# Patient Record
Sex: MALE | Race: WHITE | Employment: FULL TIME | URBAN - METROPOLITAN AREA
[De-identification: names, ages, dates, MRNs, and addresses within clinical notes are randomized per-mention and may not be internally consistent; named-entity substitution may affect disease eponyms.]

---

## 2020-12-16 ENCOUNTER — OFFICE VISIT (OUTPATIENT)
Dept: NEUROLOGY | Facility: CLINIC | Age: 70
End: 2020-12-16
Payer: COMMERCIAL

## 2020-12-16 VITALS — WEIGHT: 193 LBS | BODY MASS INDEX: 26.14 KG/M2 | HEIGHT: 72 IN

## 2020-12-16 DIAGNOSIS — I25.10 ATHEROSCLEROSIS OF NATIVE CORONARY ARTERY OF NATIVE HEART WITHOUT ANGINA PECTORIS: ICD-10-CM

## 2020-12-16 DIAGNOSIS — K21.9 GASTROESOPHAGEAL REFLUX DISEASE WITHOUT ESOPHAGITIS: ICD-10-CM

## 2020-12-16 DIAGNOSIS — Z98.890 HISTORY OF LUMBAR LAMINECTOMY: Primary | ICD-10-CM

## 2020-12-16 PROBLEM — R20.0 NUMBNESS OF RIGHT FOOT: Status: ACTIVE | Noted: 2018-05-15

## 2020-12-16 PROCEDURE — 3008F BODY MASS INDEX DOCD: CPT | Performed by: PHYSICAL MEDICINE & REHABILITATION

## 2020-12-16 PROCEDURE — 99204 OFFICE O/P NEW MOD 45 MIN: CPT | Performed by: PHYSICAL MEDICINE & REHABILITATION

## 2020-12-16 RX ORDER — ATORVASTATIN CALCIUM 80 MG/1
80 TABLET, FILM COATED ORAL NIGHTLY
COMMUNITY
Start: 2017-01-01

## 2020-12-16 RX ORDER — TIZANIDINE 2 MG/1
2 TABLET ORAL
COMMUNITY
Start: 2020-12-08

## 2020-12-16 RX ORDER — SILDENAFIL 100 MG/1
TABLET, FILM COATED ORAL
COMMUNITY
Start: 2019-12-31

## 2020-12-16 RX ORDER — ALFUZOSIN HYDROCHLORIDE 10 MG/1
10 TABLET, EXTENDED RELEASE ORAL DAILY
COMMUNITY

## 2020-12-16 RX ORDER — OMEPRAZOLE 20 MG/1
20 CAPSULE, DELAYED RELEASE ORAL
COMMUNITY

## 2020-12-16 RX ORDER — DICLOFENAC SODIUM 75 MG/1
75 TABLET, DELAYED RELEASE ORAL 2 TIMES DAILY
COMMUNITY
Start: 2020-12-08

## 2020-12-16 RX ORDER — ASPIRIN 81 MG/1
1 TABLET, CHEWABLE ORAL DAILY
COMMUNITY

## 2020-12-16 RX ORDER — DICLOFENAC SODIUM 75 MG/1
TABLET, DELAYED RELEASE ORAL
COMMUNITY
Start: 2020-12-08 | End: 2020-12-16

## 2020-12-16 RX ORDER — CLOBETASOL PROPIONATE 0.5 MG/G
OINTMENT TOPICAL
COMMUNITY
Start: 2020-08-20

## 2020-12-16 NOTE — PROGRESS NOTES
[address]  Progress Note    CHIEF COMPLAINT:  Patient presents with:  Low Back Pain: New patient referred by Dr. Amparo Monae for low back pain.  Patient states that he hurt his back at work in 2015 while trowing walking are all fine. He walked an hour and a half on the treadmill the other day. PAST MEDICAL HISTORY:  No past medical history on file. SURGICAL HISTORY:  No past surgical history on file.     SOCIAL HISTORY:   Social History    Occupational Histo denies  Bladder Incontinence: denies  Pelvic Pain: denies  Painful Urination: denies   Musculoskeletal  Joint Stiffness: admits  Painful Joints: admits  Tailbone Pain: denies  Swollen Joints: denies   Peripheral Vascular  Swelling of Legs/Feet: denies  Col ASSESSMENT AND PLAN:  1. History of lumbar laminectomy  He is doing extremely well status post fusion. He is concerned that he is not progressing as seamlessly as he desires back to full duty work. I reassured him that he is doing well.   He is 70 and i

## 2020-12-17 ENCOUNTER — MED REC SCAN ONLY (OUTPATIENT)
Dept: NEUROLOGY | Facility: CLINIC | Age: 70
End: 2020-12-17

## 2020-12-21 ENCOUNTER — PATIENT MESSAGE (OUTPATIENT)
Dept: NEUROLOGY | Facility: CLINIC | Age: 70
End: 2020-12-21

## 2020-12-21 RX ORDER — PREDNISONE 10 MG/1
TABLET ORAL
Qty: 28 TABLET | Refills: 0 | Status: SHIPPED | OUTPATIENT
Start: 2020-12-21 | End: 2021-01-05 | Stop reason: ALTCHOICE

## 2020-12-21 RX ORDER — DIAZEPAM 5 MG/1
5 TABLET ORAL NIGHTLY
Qty: 14 TABLET | Refills: 0 | Status: SHIPPED | OUTPATIENT
Start: 2020-12-21 | End: 2020-12-23

## 2020-12-21 NOTE — TELEPHONE ENCOUNTER
From: Shannon Almaguer  To: Priscilla Hankins MD  Sent: 12/21/2020 9:46 AM CST  Subject: Other    My pain in my lumbar spine is getting progressively worse. I get episodes of moderate to severe pain later in the day from 6 to 7+.  Very mild stretching madeline

## 2020-12-22 ENCOUNTER — TELEPHONE (OUTPATIENT)
Dept: NEUROLOGY | Facility: CLINIC | Age: 70
End: 2020-12-22

## 2020-12-22 NOTE — TELEPHONE ENCOUNTER
Patient states Dr. Quoc Deluca gave him Tramadol and Tizanidine. Patient started the medication and is unsure what he should do with the medication from Dr. Charles Villatoro. Dr. Chapincito Weeks recommended patient f/u with Dr. Charles Villatoro. Patient scheduled appt for 12/23/20.

## 2020-12-23 ENCOUNTER — MED REC SCAN ONLY (OUTPATIENT)
Dept: NEUROLOGY | Facility: CLINIC | Age: 70
End: 2020-12-23

## 2020-12-23 ENCOUNTER — PATIENT MESSAGE (OUTPATIENT)
Dept: NEUROLOGY | Facility: CLINIC | Age: 70
End: 2020-12-23

## 2020-12-23 ENCOUNTER — TELEPHONE (OUTPATIENT)
Dept: NEUROLOGY | Facility: CLINIC | Age: 70
End: 2020-12-23

## 2020-12-23 ENCOUNTER — OFFICE VISIT (OUTPATIENT)
Dept: NEUROLOGY | Facility: CLINIC | Age: 70
End: 2020-12-23
Payer: COMMERCIAL

## 2020-12-23 VITALS — HEIGHT: 72 IN | BODY MASS INDEX: 26.14 KG/M2 | WEIGHT: 193 LBS

## 2020-12-23 DIAGNOSIS — M96.1 POSTLAMINECTOMY SYNDROME: Primary | ICD-10-CM

## 2020-12-23 PROCEDURE — 99214 OFFICE O/P EST MOD 30 MIN: CPT | Performed by: PHYSICAL MEDICINE & REHABILITATION

## 2020-12-23 PROCEDURE — 3008F BODY MASS INDEX DOCD: CPT | Performed by: PHYSICAL MEDICINE & REHABILITATION

## 2020-12-23 RX ORDER — TRAMADOL HYDROCHLORIDE 50 MG/1
50 TABLET ORAL
COMMUNITY
Start: 2020-12-21

## 2020-12-23 NOTE — TELEPHONE ENCOUNTER
CT SPINE LUMBAR FOR MYELOGRAM (IV)(CPT=72132-pending approval  Called [facility] spoke to [place] A who states authorization and clincal notes are require. Will fax clinical notes once available.    Case # [id number]   Fax to : [phone]    12/24/20 UPDATE:  Marvin Medeiros

## 2020-12-23 NOTE — PROGRESS NOTES
[address]  Progress Note    CHIEF COMPLAINT:  Patient presents with:  Low Back Pain: Patient presents to follow up on low back pain.  LOV 12/16/2020 Patient states that his pain has tremendously icreased sin severe, idiosyncratic exacerbating factors with some persistent numbness of the lateral aspect of the right foot. There is no sciatica. No bowel or bladder incontinence. Symptoms are intermittent and associated with heavier lifting tasks.   Standing and ROS  Constitutional  Sleep Disturbance: admits   Cardiovascular  Chest Pain: denies  Irregular Heartbeat: denies   Gastrointestinal  Bowel Incontinence: denies  Heartburn: denies   Genitourinary  Difficulty Urinating: denies  Bladder Incontinence: denies AMs.  He will also use tizanidine PRN, Tramadol PRN, and no valium. - XR LUMBAR SPINE COMPLETE W/FLEX + EXT (CPT=72114); Future  - XR MYELOGRAM LUMBAR WITH INJECTION  (CPT=62304); Future  - CT SPINE LUMBAR FOR MYELOGRAM (IV)(CPT=72132);  Future  - SARS-COV

## 2020-12-23 NOTE — TELEPHONE ENCOUNTER
Spoke to Landon Mehta at [address] Requesting ok to hold aspirin for myelogram. Faxed form to them for completion. Patient office visit today 12/23/20. Instructed to wait to schedule myelogram until ok to hold aspirin.  Need authorization fro

## 2020-12-24 ENCOUNTER — PATIENT MESSAGE (OUTPATIENT)
Dept: NEUROLOGY | Facility: CLINIC | Age: 70
End: 2020-12-24

## 2020-12-24 NOTE — TELEPHONE ENCOUNTER
Spoke to patient and notified him of Dr. Juan Shay message below. He was understanding and thankful for the call.

## 2020-12-24 NOTE — TELEPHONE ENCOUNTER
Spoke with patient and informed him of Dr. Yana Robertson note dated 12/23/2020 9:16 am.  Pt verbalized understanding and stated that he is sending a message to Dr. Bhupinder Eden today.

## 2020-12-24 NOTE — TELEPHONE ENCOUNTER
Regarding: Visit Follow-up Question  Contact: [phone]  ----- Message from Marianna Mccarty MD sent at 12/24/2020  9:26 AM CST -----       ----- Message from Luz Chappell to Wendy Hsieh MD sent at 12/23/2020  8:09 PM -----   I meant to adriana

## 2020-12-24 NOTE — TELEPHONE ENCOUNTER
From: Dale James  To: Marya Worthy MD  Sent: 12/24/2020 9:10 AM CST  Subject: Visit Follow-up Question    Follow up to 12-23-20 message about rashes. Third day of Prednisone rashes looking better.

## 2020-12-24 NOTE — TELEPHONE ENCOUNTER
From: Denisa Null  To: Frances Ace MD  Sent: 12/23/2020 8:09 PM CST  Subject: Visit Follow-up Question    I meant to bring up the rashes on my back and frontal areas in my visit with Dr Hortensia Mane today.  I meant to ask him about what he thought of

## 2020-12-24 NOTE — TELEPHONE ENCOUNTER
Please let him know I am not sure what to make of the rashes. If he has any questions about whether they are due to the fusion hardware, I would recommend asking Dr. Arnaldo Winn.   If he has no answers, I would recommend seeing a dermatologist.  Brisa Rhoades they are

## 2020-12-26 ENCOUNTER — HOSPITAL ENCOUNTER (OUTPATIENT)
Dept: GENERAL RADIOLOGY | Facility: HOSPITAL | Age: 70
Discharge: HOME | End: 2020-12-26
Attending: PHYSICAL MEDICINE & REHABILITATION
Payer: COMMERCIAL

## 2020-12-26 DIAGNOSIS — M96.1 POSTLAMINECTOMY SYNDROME: ICD-10-CM

## 2020-12-26 PROCEDURE — 72114 X-RAY EXAM L-S SPINE BENDING: CPT | Performed by: PHYSICAL MEDICINE & REHABILITATION

## 2020-12-28 ENCOUNTER — TELEPHONE (OUTPATIENT)
Dept: NEUROLOGY | Facility: CLINIC | Age: 70
End: 2020-12-28

## 2020-12-28 NOTE — TELEPHONE ENCOUNTER
Called patient in regards to lumbar spine xray results . Patient verbalized understanding . Patient asked if Dr. Conor Arriaga wanted to see the lumbar spine x ray that he got done at [facility] for reference and comparison.

## 2020-12-28 NOTE — TELEPHONE ENCOUNTER
CT SPINE LUMBAR FOR MYELOGRAM (IV)(CPT=72132- pending  Called [facility], spoke to Carito Farmer who states request is denied due to insufficient information received for case # [id number].     The reason for our determination is: [place] [id number] [id number]

## 2020-12-28 NOTE — TELEPHONE ENCOUNTER
----- Message from Kingston Vidal MD sent at 12/28/2020  9:31 AM CST -----  I personally reviewed plain film x-rays of the lumbar spine from December 2020 showing no abnormalities of the hardware.   There is a L4-S1 fusion with intact anterior and poster

## 2020-12-28 NOTE — TELEPHONE ENCOUNTER
I already saw the old one, and the new x-ray looks the same. Please let him know.   He needs to get the CT myelogram

## 2020-12-28 NOTE — TELEPHONE ENCOUNTER
Spoke to Ailyn at [address] [facility]. Will confirm how many days patient can hold aspirin. Will call here or fax reply to this office.

## 2020-12-29 NOTE — TELEPHONE ENCOUNTER
Called [facility] for status. Spoke to [address] who states request for   CT SPINE LUMBAR FOR MYELOGRAM (IV)(CPT=72132 is under reconsideration review - case # [id number]. Note: reconsideration turn around time 5 days.    Reference call # [id number]

## 2021-01-04 NOTE — TELEPHONE ENCOUNTER
CT SPINE LUMBAR FOR MYELOGRAM (IV)(CPT=72132- APPROVED  [facility] online, request above is approved. Authorization # [id number] effective date: 12/25/20 thru 2/18/21  Patient notified via [facility] determination letter sent to scanning.

## 2021-01-04 NOTE — TELEPHONE ENCOUNTER
Spoke to [address] [place] [address]. Ok to hold aspirin for up to 10 days. Spoke to patient and advised to hold aspirin for 7 days and diclofenac for 5 days.    Patient states he has already held aspirin in anticipation of myelogram. Patient

## 2021-01-05 VITALS — HEIGHT: 72 IN | BODY MASS INDEX: 25.73 KG/M2 | WEIGHT: 190 LBS

## 2021-01-08 ENCOUNTER — TELEPHONE (OUTPATIENT)
Dept: NEUROLOGY | Facility: CLINIC | Age: 71
End: 2021-01-08

## 2021-01-09 ENCOUNTER — LAB ENCOUNTER (OUTPATIENT)
Dept: LAB | Facility: HOSPITAL | Age: 71
End: 2021-01-09
Attending: PHYSICAL MEDICINE & REHABILITATION
Payer: COMMERCIAL

## 2021-01-09 DIAGNOSIS — M96.1 POSTLAMINECTOMY SYNDROME: ICD-10-CM

## 2021-01-09 LAB — SARS-COV-2 RNA RESP QL NAA+PROBE: NOT DETECTED

## 2021-01-11 ENCOUNTER — PATIENT MESSAGE (OUTPATIENT)
Dept: NEUROLOGY | Facility: CLINIC | Age: 71
End: 2021-01-11

## 2021-01-11 NOTE — TELEPHONE ENCOUNTER
S/w radiology and confirmed with Diomedes Allen RN who states patient does not need labs drawn prior to CT myelogram. Confirmed with patient to arrive at [facility] at 10:30am and that he can take his regular medication with small amount of water.  Patient Angelica Gil

## 2021-01-11 NOTE — TELEPHONE ENCOUNTER
From: Marisol Wan  To: Ankita Roblero MD  Sent: 2021 9:51 AM CST  Subject: Non-Urgent Medical Question    Do I need to fast for my lab work tomorrow at 10:00am and should I take any meds tomorrow morning ? Atorvastatin and prilosec is what

## 2021-01-12 ENCOUNTER — HOSPITAL ENCOUNTER (OUTPATIENT)
Dept: GENERAL RADIOLOGY | Facility: HOSPITAL | Age: 71
Discharge: HOME | End: 2021-01-12
Attending: PHYSICAL MEDICINE & REHABILITATION
Payer: COMMERCIAL

## 2021-01-12 ENCOUNTER — HOSPITAL ENCOUNTER (OUTPATIENT)
Dept: CT IMAGING | Facility: HOSPITAL | Age: 71
Discharge: HOME | End: 2021-01-12
Attending: PHYSICAL MEDICINE & REHABILITATION
Payer: COMMERCIAL

## 2021-01-12 VITALS
BODY MASS INDEX: 25.73 KG/M2 | HEIGHT: 72 IN | SYSTOLIC BLOOD PRESSURE: 154 MMHG | DIASTOLIC BLOOD PRESSURE: 87 MMHG | RESPIRATION RATE: 15 BRPM | OXYGEN SATURATION: 96 % | HEART RATE: 67 BPM | WEIGHT: 190 LBS

## 2021-01-12 DIAGNOSIS — M96.1 POSTLAMINECTOMY SYNDROME: ICD-10-CM

## 2021-01-12 PROCEDURE — 62304 MYELOGRAPHY LUMBAR INJECTION: CPT | Performed by: PHYSICAL MEDICINE & REHABILITATION

## 2021-01-12 PROCEDURE — 72132 CT LUMBAR SPINE W/DYE: CPT | Performed by: PHYSICAL MEDICINE & REHABILITATION

## 2021-01-12 RX ORDER — LIDOCAINE HYDROCHLORIDE 10 MG/ML
INJECTION, SOLUTION EPIDURAL; INFILTRATION; INTRACAUDAL; PERINEURAL
Status: COMPLETED
Start: 2021-01-12 | End: 2021-01-12

## 2021-01-12 RX ORDER — LIDOCAINE HYDROCHLORIDE 10 MG/ML
10 INJECTION, SOLUTION EPIDURAL; INFILTRATION; INTRACAUDAL; PERINEURAL ONCE
Status: COMPLETED | OUTPATIENT
Start: 2021-01-12 | End: 2021-01-12

## 2021-01-12 RX ADMIN — LIDOCAINE HYDROCHLORIDE 10 ML: 10 INJECTION, SOLUTION EPIDURAL; INFILTRATION; INTRACAUDAL; PERINEURAL at 13:00:00

## 2021-01-12 NOTE — IMAGING NOTE
1300  Patient to rad holding. Monitor applied, VSS. Dressing clean dry and intact. Water and crackers provided. Wife at bedside. Call light within reach. Will continue to monitor. [id number]  Discharge instructions given verbally and written. All questions an

## 2021-01-12 NOTE — IMAGING NOTE
SBAR  S; PT PRESENT FOR LUMBAR MYELOGRAM]  B;  HX BACK PAIN, PT STATES -2 PRIOR PROCEDURE, PT HAS RODS, FUSION 5 MTHS AGO. PT STATES ASA 81 MG LAST DOSE 1 WK AGO, MEDICATION CONTRADICTION FOR PROCEDURE.    A;  PT APPEARS AND STATES ANXIOUS,  VS- ELEVATED B

## 2021-01-13 ENCOUNTER — TELEPHONE (OUTPATIENT)
Dept: NEUROLOGY | Facility: CLINIC | Age: 71
End: 2021-01-13

## 2021-01-13 NOTE — TELEPHONE ENCOUNTER
----- Message from Santosh Garber MD sent at 1/12/2021  8:51 PM CST -----  The CT myelogram shows the fusion screws are OK, but the bone may still be healing according to the radiologist.  I recommend he bring the CT films to Dr. Alex Vazquez for his review

## 2021-01-13 NOTE — TELEPHONE ENCOUNTER
Please let him know that is a good question, and that he should ask Dr. Marla Cordova because he is the surgeon and will give him the best answer.

## 2021-01-13 NOTE — TELEPHONE ENCOUNTER
S/W and he verbalized he understood the results. Patient would like to know if this is normal at 5 months. Please advise.

## 2021-01-27 ENCOUNTER — TELEPHONE (OUTPATIENT)
Dept: NEUROLOGY | Facility: CLINIC | Age: 71
End: 2021-01-27

## 2021-01-27 ENCOUNTER — MED REC SCAN ONLY (OUTPATIENT)
Dept: NEUROLOGY | Facility: CLINIC | Age: 71
End: 2021-01-27

## 2021-01-27 ENCOUNTER — OFFICE VISIT (OUTPATIENT)
Dept: NEUROLOGY | Facility: CLINIC | Age: 71
End: 2021-01-27
Payer: COMMERCIAL

## 2021-01-27 VITALS — WEIGHT: 190 LBS | BODY MASS INDEX: 25.73 KG/M2 | HEIGHT: 72 IN

## 2021-01-27 DIAGNOSIS — M79.18 MYOFASCIAL PAIN: ICD-10-CM

## 2021-01-27 DIAGNOSIS — K21.9 GASTROESOPHAGEAL REFLUX DISEASE WITHOUT ESOPHAGITIS: ICD-10-CM

## 2021-01-27 DIAGNOSIS — M96.1 POSTLAMINECTOMY SYNDROME: Primary | ICD-10-CM

## 2021-01-27 DIAGNOSIS — I25.10 ATHEROSCLEROSIS OF NATIVE CORONARY ARTERY OF NATIVE HEART WITHOUT ANGINA PECTORIS: ICD-10-CM

## 2021-01-27 PROCEDURE — 99214 OFFICE O/P EST MOD 30 MIN: CPT | Performed by: PHYSICAL MEDICINE & REHABILITATION

## 2021-01-27 PROCEDURE — 3008F BODY MASS INDEX DOCD: CPT | Performed by: PHYSICAL MEDICINE & REHABILITATION

## 2021-01-27 NOTE — PROGRESS NOTES
[address]  Progress Note    CHIEF COMPLAINT:  Patient presents with:  Back Pain: Patient presents to follow up on low back pain.  LOV 12/23/2020 Patient stattes that he saw Dr. Orlando Rodriguez as Dr. Angel Kevin suggested occasionally very severe, idiosyncratic exacerbating factors with some persistent numbness of the lateral aspect of the right foot.  There is no sciatica.  No bowel or bladder incontinence.  Symptoms are intermittent and associated with heavier lifting tas TWICE DAILY     • Sildenafil Citrate 100 MG Oral Tab      • Diclofenac Sodium 75 MG Oral Tab EC Take 75 mg by mouth 2 (two) times daily. • Alfuzosin HCl ER 10 MG Oral Tablet 24 Hr Take 10 mg by mouth daily.          ALLERGIES:   No Known Allergies    RE atherosclerosis of the aorta. 4.  A lumbar CT myelogram shows the fusion screws are OK, but the interbody bone may still be healing according to the radiologist.        ASSESSMENT AND PLAN:  1. Postlaminectomy syndrome  This is anatomically stable.   Diandra Nash

## 2021-01-27 NOTE — TELEPHONE ENCOUNTER
Trigger point injection right paraspinals CPT CODE: [id number]   [facility] Online, request above is authorized. Thank you for your online Notification/Prior Authorization submission.     The notification/prior authorization case information was transmitted o

## 2021-01-27 NOTE — TELEPHONE ENCOUNTER
Called patient to schedule TPI. Patient getting COVID vaccines. Informed patient injections need to be spaced out 14 days from the vaccine. Patient will call to schedule once he has second vaccine completed.

## 2021-02-17 ENCOUNTER — TELEPHONE (OUTPATIENT)
Dept: NEUROLOGY | Facility: CLINIC | Age: 71
End: 2021-02-17

## 2021-02-17 NOTE — TELEPHONE ENCOUNTER
Sarah Mckeon PSR from [facility] calling to request office visit note from 01/27/21 be dictated.

## 2021-02-22 ENCOUNTER — TELEPHONE (OUTPATIENT)
Dept: NEUROLOGY | Facility: CLINIC | Age: 71
End: 2021-02-22

## 2021-03-12 ENCOUNTER — OFFICE VISIT (OUTPATIENT)
Dept: NEUROLOGY | Facility: CLINIC | Age: 71
End: 2021-03-12
Payer: COMMERCIAL

## 2021-03-12 VITALS — HEIGHT: 72 IN | BODY MASS INDEX: 25.6 KG/M2 | WEIGHT: 189 LBS

## 2021-03-12 DIAGNOSIS — M79.18 MYOFASCIAL PAIN: Primary | ICD-10-CM

## 2021-03-12 DIAGNOSIS — M96.1 POSTLAMINECTOMY SYNDROME: ICD-10-CM

## 2021-03-12 PROCEDURE — 20552 NJX 1/MLT TRIGGER POINT 1/2: CPT | Performed by: PHYSICAL MEDICINE & REHABILITATION

## 2021-03-12 PROCEDURE — 3008F BODY MASS INDEX DOCD: CPT | Performed by: PHYSICAL MEDICINE & REHABILITATION

## 2021-03-12 NOTE — PROCEDURES
Trigger point injection  Location:  bilateral lumbar paraspinals  After discussing benefits and possible side effects, we proceeded with a trigger point injection. The patient was consented. The patient was seated, and the muscle was palpated.   The area o

## 2021-03-13 DIAGNOSIS — Z23 NEED FOR VACCINATION: ICD-10-CM

## 2021-03-15 PROCEDURE — 3008F BODY MASS INDEX DOCD: CPT | Performed by: PHYSICAL MEDICINE & REHABILITATION

## 2021-03-15 RX ORDER — TRIAMCINOLONE ACETONIDE 40 MG/ML
40 INJECTION, SUSPENSION INTRA-ARTICULAR; INTRAMUSCULAR ONCE
Status: COMPLETED | OUTPATIENT
Start: 2021-03-15 | End: 2021-03-15

## 2021-03-15 RX ORDER — LIDOCAINE HYDROCHLORIDE 10 MG/ML
2 INJECTION, SOLUTION INFILTRATION; PERINEURAL ONCE
Status: COMPLETED | OUTPATIENT
Start: 2021-03-15 | End: 2021-03-15

## 2022-02-21 ENCOUNTER — TELEPHONE (OUTPATIENT)
Dept: NEUROLOGY | Facility: CLINIC | Age: 72
End: 2022-02-21

## (undated) NOTE — LETTER
[address], [place]  Authorization for Invasive Procedures  1.  I hereby authorize Dr. Jenny Infante , my physician and whomever may be designated as the doctor's assistant, to perform the following operation and/or procedure:  Lumbar M 4. Should the need arise during my operation or immediate post-operative period; I also consent to the administration of blood and/or blood products.  Further, I understand that despite careful testing and screening of blood and blood products, I may still 9. Patients having a sterilization procedure: I understand that if the procedure is successful the results will be permanent and it will therefore be impossible for me to inseminate, conceive or bear children.  I also understand that the procedure is intend

## (undated) NOTE — LETTER
[address]   Date:   3/12/2021     Name:   Cheyenne Win    YOB: 1950   MRN:   [id number]       WHERE IS YOUR PAIN NOW? Johnny the areas on your body where you feel the described sensations.   Use the appropri

## (undated) NOTE — LETTER
[address]   Date:   12/16/2020     Name:   Morena Ellis    YOB: 1950   MRN:   [id number]       WHERE IS YOUR PAIN NOW? Johnny the areas on your body where you feel the described sensations.   Use the appropria

## (undated) NOTE — LETTER
[address]   Date:   12/23/2020     Name:   Yulisa Castaneda    YOB: 1950   MRN:   [id number]       WHERE IS YOUR PAIN NOW? Johnny the areas on your body where you feel the described sensations.   Use the appropria

## (undated) NOTE — LETTER
20        To Whom It May Concern:      Dale James  :  1950    []  Patient has been cleared to hold aspirin for procedure myelogram.  Holding the medication(s) may put the patient at an increased risk for stroke, heart attack, or neur

## (undated) NOTE — LETTER
AUTHORIZATION FOR SURGICAL OPERATION OR OTHER PROCEDURE    1.  I hereby authorize Dr. Norma Crum, and [facility] staff assigned to my case to perform the following operation and/or procedure at the [facility]: Trigger point injection right paraspi Relationship to Patient:           []  Parent    Responsible person                          []  Spouse  In case of minor or                    [] Other  _____________   Incompetent name:  __________________________________________________

## (undated) NOTE — LETTER
[address] 37   Date:   1/27/2021     Name:   Cheyenne Win    YOB: 1950   MRN:   [id number]       WHERE IS YOUR PAIN NOW? Johnny the areas on your body where you feel the described sensations.   Use the appropri